# Patient Record
Sex: FEMALE | Race: BLACK OR AFRICAN AMERICAN
[De-identification: names, ages, dates, MRNs, and addresses within clinical notes are randomized per-mention and may not be internally consistent; named-entity substitution may affect disease eponyms.]

---

## 2017-04-11 ENCOUNTER — HOSPITAL ENCOUNTER (EMERGENCY)
Dept: HOSPITAL 62 - ER | Age: 27
LOS: 1 days | Discharge: HOME | End: 2017-04-12
Payer: MEDICAID

## 2017-04-11 DIAGNOSIS — Z88.0: ICD-10-CM

## 2017-04-11 DIAGNOSIS — Z87.19: ICD-10-CM

## 2017-04-11 DIAGNOSIS — M79.1: ICD-10-CM

## 2017-04-11 DIAGNOSIS — R00.0: ICD-10-CM

## 2017-04-11 DIAGNOSIS — J02.0: Primary | ICD-10-CM

## 2017-04-11 DIAGNOSIS — R11.2: ICD-10-CM

## 2017-04-11 PROCEDURE — S0119 ONDANSETRON 4 MG: HCPCS

## 2017-04-11 PROCEDURE — 99283 EMERGENCY DEPT VISIT LOW MDM: CPT

## 2017-04-11 PROCEDURE — 87880 STREP A ASSAY W/OPTIC: CPT

## 2017-04-11 PROCEDURE — 96360 HYDRATION IV INFUSION INIT: CPT

## 2017-04-12 VITALS — SYSTOLIC BLOOD PRESSURE: 119 MMHG | DIASTOLIC BLOOD PRESSURE: 73 MMHG

## 2017-04-12 NOTE — ER DOCUMENT REPORT
ED Flu Like





- General


Chief Complaint: Flu Symptoms


Stated Complaint: FLU LIKE SYMPTOMS


Time seen by provider: 03:23


Mode of Arrival: Ambulatory


Information source: Patient


TRAVEL OUTSIDE OF THE [place] IN LAST 30 DAYS: No





- HPI


Patient complains to provider of: fever, sore throat, vomiting


Onset: Yesterday


Timing/Duration: Persistent


Quality of pain: Achy


Severity: Mild


Pain Level: 2


Associated symptoms: Body/muscle aches, Fever, Nausea, Vomiting, Sore throat


Similar symptoms previously: No


Recently seen / treated by doctor: No


Notes: 


Patient is a 26-year-old female who presents to the emergency room complaining 

of sore throat, subjective fever, vomiting that started yesterday, she denies 

any sick contacts, no recent traveling, denies chest pain, no abdominal pain no 

diarrhea, patient did not take any medication at home prior to coming to the 

emergency room





- Related Data


Allergies/Adverse Reactions: 


 





Penicillins Allergy (Verified 04/24/16 08:33)


 











Past Medical History





- General


Information source: Patient





- Social History


Smoking Status: Never Smoker


Chew tobacco use (# tins/day): No


Frequency of alcohol use: Rare


Drug Abuse: None


Family History: CVA, Other - anemia


Patient has suicidal ideation: No


Patient has homicidal ideation: No


Pulmonary Medical History: 


   Denies: Hx Asthma


Neurological Medical History: Reports: Hx Migraine


Renal/ Medical History: Denies: Hx Peritoneal Dialysis


GI Medical History: Denies: Hx Gastroesophageal Reflux Disease


Past Surgical History: Reports: Hx Breast Surgery - reduction 2006





- Immunizations


Hx Diphtheria, Pertussis, Tetanus Vaccination: Yes





Review of Systems





- Review of Systems


Constitutional: See HPI


EENT: See HPI


Cardiovascular: No symptoms reported


Respiratory: No symptoms reported


Gastrointestinal: See HPI


Genitourinary: No symptoms reported


Female Genitourinary: No symptoms reported


Musculoskeletal: No symptoms reported


Skin: No symptoms reported


Hematologic/Lymphatic: No symptoms reported


Neurological/Psychological: No symptoms reported


-: Yes All other systems reviewed and negative





Physical Exam





- Vital signs


Vitals: 


 











Temp Pulse Resp BP Pulse Ox


 


 99.8 F   133 H  22 H  113/65   97 


 


 04/11/17 22:35  04/11/17 22:35  04/11/17 22:35  04/11/17 22:35  04/11/17 22:35











Interpretation: Tachycardic





- General


General appearance: Alert


In distress: None





- HEENT


Head: Normocephalic, Atraumatic


Eyes: Normal


Conjunctiva: Normal


Extraocular movements intact: Yes


Eyelashes: Normal


Pupils: PERRL


Mouth/Lips: Normal


Mucous membranes: Normal


Pharynx: Erythema, Exudate, Tonsillar hypertrophy


Neck: Normal





- Respiratory


Respiratory status: No respiratory distress


Chest status: Nontender


Breath sounds: Normal


Chest palpation: Normal





- Cardiovascular


Rhythm: Regular, Tachycardia


Heart sounds: Normal auscultation


Murmur: No





- Abdominal


Inspection: Normal, Obese


Distension: No distension


Bowel sounds: Normal


Tenderness: Nontender


Organomegaly: No organomegaly





- Back


Back: Normal, Nontender





- Extremities


General upper extremity: Normal inspection, Nontender, Normal color, Normal ROM

, Normal temperature


General lower extremity: Normal inspection, Nontender, Normal color, Normal ROM

, Normal temperature, Normal weight bearing.  No: Adelaida's sign





- Neurological


Neuro grossly intact: Yes


Cognition: Normal


Orientation: AAOx4


[place] Coma Scale Eye Opening: Spontaneous


[place] Coma Scale Verbal: Oriented


[place] Coma Scale Motor: Obeys Commands


[place] Coma Scale Total: 15


Speech: Normal


Motor strength normal: LUE, RUE, LLE, RLE


Sensory: Normal





- Psychological


Associated symptoms: Normal affect, Normal mood





- Skin


Skin Temperature: Warm


Skin Moisture: Dry


Skin Color: Normal





Course





- Re-evaluation


Re-evalutation: 


04/12/17 04:14


Patient resting comfortably reports feeling much better, physical exam findings 

are consistent with strep pharyngitis, rapid strep test confirms this, she will 

be started on antibiotics for this, advised to follow-up with her primary care 

provider or return if symptoms worsen, patient acknowledges understanding and 

agreement with this











- Vital Signs


Vital signs: 


 











Temp Pulse Resp BP Pulse Ox


 


 99.0 F   118 H  16   119/73   97 


 


 04/12/17 03:16  04/12/17 03:16  04/12/17 03:16  04/12/17 04:01  04/12/17 04:01














Discharge





- Discharge


Clinical Impression: 


 Strep pharyngitis





Nausea and vomiting


Qualifiers:


 Vomiting type: unspecified Vomiting Intractability: non-intractable Qualified 

Code(s): R11.2 - Nausea with vomiting, unspecified





Condition: Stable


Disposition: HOME, SELF-CARE


Instructions:  Antinausea Medication ([facility]), Intravenous (IV) Fluids ([facility]), 

Strep Throat ([facility])


Additional Instructions: 


Drink plenty of fluids.  Tylenol or Motrin as needed for fever.  Follow-up with 

your primary care provider in one to 2 days.  Return to the emergency room 

immediately if symptoms worsen or any additional concerns.


Prescriptions: 


Clindamycin HCl 300 mg PO RTTIDP PRN #30 capsule


 PRN Reason: 


Forms:  Return to Work

## 2017-09-08 ENCOUNTER — HOSPITAL ENCOUNTER (EMERGENCY)
Dept: HOSPITAL 62 - ER | Age: 27
Discharge: HOME | End: 2017-09-08
Payer: COMMERCIAL

## 2017-09-08 VITALS — SYSTOLIC BLOOD PRESSURE: 119 MMHG | DIASTOLIC BLOOD PRESSURE: 74 MMHG

## 2017-09-08 DIAGNOSIS — I10: ICD-10-CM

## 2017-09-08 DIAGNOSIS — B35.4: Primary | ICD-10-CM

## 2017-09-08 DIAGNOSIS — Z88.0: ICD-10-CM

## 2017-09-08 PROCEDURE — 99283 EMERGENCY DEPT VISIT LOW MDM: CPT

## 2017-09-08 NOTE — ER DOCUMENT REPORT
ED Skin Rash/Insect Bite/Abscs





- General


Chief Complaint: Skin Problem


Stated Complaint: RASH,SWOLLEN NECK


Time Seen by Provider: 09/08/17 18:31


Mode of Arrival: Ambulatory


Information source: Patient


TRAVEL OUTSIDE OF THE [place] IN LAST 30 DAYS: No





- HPI


Patient complains to provider of: Other - dark brown irritations to right side 

of face for 2 weeks


Onset: Other - 2 weeks


Onset/Duration: Gradual


Quality of pain: Burning


Severity: Mild


Pain Level: 1


Skin Character: Other - dark brown patches to right  lower face


Quality of rash: Itchy


Identify cause: No


Exacerbated by: Denies


Relieved by: Denies


Similar symptoms previously: No


Recently seen / treated by doctor: No





- Related Data


Allergies/Adverse Reactions: 


 





Penicillins Allergy (Verified 09/08/17 17:38)


 











Past Medical History





- General


Information source: Patient





- Social History


Smoking Status: Never Smoker


Cigarette use (# per day): No


Chew tobacco use (# tins/day): No


Smoking Education Provided: No


Frequency of alcohol use: Rare


Drug Abuse: None


Occupation: exchange


Lives with: Family


Family History: CVA, Other - anemia





- Past Medical History


Cardiac Medical History: Reports: Hx Hypertension


Pulmonary Medical History: Reports: None


EENT Medical History: Reports: None


Neurological Medical History: Reports: Hx Migraine


Endocrine Medical History: Reports: None


Renal/ Medical History: Reports: None


Malignancy Medical History: Reports: None


GI Medical History: Reports: None


Musculoskeltal Medical History: Reports None


Skin Medical History: Reports None


Psychiatric Medical History: Reports: None


Traumatic Medical History: Reports: None


Infectious Medical History: Reports: None


Past Surgical History: Reports: Hx Breast Surgery - reduction 2006





- Immunizations


Hx Diphtheria, Pertussis, Tetanus Vaccination: Yes





Review of Systems





- Review of Systems


Constitutional: No symptoms reported


EENT: Other - fungal dark spots  to face started 3 weeks ago, tender post 

aricula node


Cardiovascular: No symptoms reported


Respiratory: No symptoms reported


Gastrointestinal: No symptoms reported


Genitourinary: No symptoms reported


Female Genitourinary: No symptoms reported


Musculoskeletal: No symptoms reported


Skin: No symptoms reported


Hematologic/Lymphatic: No symptoms reported


Neurological/Psychological: No symptoms reported


-: Yes All other systems reviewed and negative





Physical Exam





- Vital signs


Vitals: 





 











Temp Pulse Resp BP Pulse Ox


 


 98.8 F   106 H  18   119/74   96 


 


 09/08/17 17:39  09/08/17 17:39  09/08/17 17:39  09/08/17 17:39  09/08/17 17:39











Interpretation: Normal





- General


General appearance: Appears well, Alert





- HEENT


Head: Normocephalic, Atraumatic


Eyes: Normal


Pupils: PERRL





- Respiratory


Respiratory status: No respiratory distress


Chest status: Nontender


Breath sounds: Normal


Chest palpation: Normal





- Cardiovascular


Rhythm: Regular


Heart sounds: Normal auscultation


Murmur: No





- Abdominal


Inspection: Normal


Distension: No distension


Bowel sounds: Normal


Tenderness: Nontender


Organomegaly: No organomegaly





- Back


Back: Normal, Nontender





- Extremities


General upper extremity: Normal inspection, Nontender, Normal color, Normal ROM

, Normal temperature


General lower extremity: Normal inspection, Nontender, Normal color, Normal ROM

, Normal temperature, Normal weight bearing.  No: Adelaida's sign





- Neurological


Neuro grossly intact: Yes


Cognition: Normal


Orientation: AAOx4


[place] Coma Scale Eye Opening: Spontaneous


[place] Coma Scale Verbal: Oriented


[place] Coma Scale Motor: Obeys Commands


[place] Coma Scale Total: 15


Speech: Normal


Motor strength normal: LUE, RUE, LLE, RLE


Sensory: Normal





- Psychological


Associated symptoms: Normal affect, Normal mood





- Skin


Skin Temperature: Warm


Skin Moisture: Dry


Skin Color: Normal


Location of irregularity: Face


Character of irregularity: Other - dark brown fungal infection to right lower 

are of face





Course





- Re-evaluation


Re-evalutation: 





09/08/17 18:58


Discussed treatment with patient after she was assessed by Dr. Daniel.  Patient 

discharged home with prescription for Lotrisone





- Vital Signs


Vital signs: 





 











Temp Pulse Resp BP Pulse Ox


 


 98.8 F   106 H  18   119/74   96 


 


 09/08/17 17:39  09/08/17 17:39  09/08/17 17:39  09/08/17 17:39  09/08/17 17:39














Discharge





- Discharge


Clinical Impression: 


 Tinea corporis





Condition: Stable


Disposition: HOME, SELF-CARE


Additional Instructions: 


You have a fungal infection to your face called tinea will need to be treated 

with antifungal cream..





Topical Antifungal





     You have been given a prescription for an antifungal skin cream. Examples 

include Lamisil (terbinafine), Lotrimin (clotrimazole), Monistat-Derm (

miconazole), Nizoral (ketoconazole), and Tinactin (tolnaftate).


     It may be several days before improvement occurs.  You should use the 

medicine for about two weeks to eliminate the fungus, unless the doctor tells 

you otherwise.


     It is unusual to be allergic to these medicines.  Call the doctor if you 

develop blisters, severe itching, worsening redness, swelling, or pain, or if 

you don't see any improvement within one week.





FOLLOW-UP CARE:


If you have been referred to a physician for follow-up care, call the physician

s office for an appointment as you were instructed or within the next two days.

  If you experience worsening or a significant change in your symptoms, notify 

the physician immediately or return to the Emergency Department at any time for 

re-evaluation.





Prescriptions: 


Clotrimazole/Betamethasone Dip [Lotrisone Cream 15 gm] 1 applic TP BID #1 tube


Forms:  Return to Work


Referrals: 


YOSELIN LEWIS,  [ACTIVE STAFF] - Follow up as needed

## 2018-02-02 ENCOUNTER — HOSPITAL ENCOUNTER (OUTPATIENT)
Dept: HOSPITAL 62 - OD | Age: 28
End: 2018-02-02
Attending: FAMILY MEDICINE
Payer: COMMERCIAL

## 2018-02-02 DIAGNOSIS — M79.675: Primary | ICD-10-CM

## 2018-02-02 NOTE — RADIOLOGY REPORT (SQ)
EXAM DESCRIPTION:  FOOT LEFT COMPLETE



COMPLETED DATE/TIME:  2/2/2018 10:47 am



REASON FOR STUDY:  PAIN IN LEFT TOE(S) M79.675  PAIN IN LEFT TOE(S)



COMPARISON:  None.



NUMBER OF VIEWS:  Three views.



TECHNIQUE:  AP, lateral and oblique without weight bearing radiographic images acquired of the left f
oot.



LIMITATIONS:  None.



FINDINGS:  MINERALIZATION: Normal.

BONES: No acute fracture or dislocation. No worrisome bone lesions. No significant osteophytes.

JOINTS: No erosions.  No wayne-articular osteopenia.  No chondrocalcinosis.

SOFT TISSUES: No swelling.  No calcifications.

OTHER: No other significant finding.



IMPRESSION:  NEGATIVE STUDY OF THE LEFT FOOT. NO EXPLANATION FOR PAIN.



TECHNICAL DOCUMENTATION:  JOB ID:  [id number]

 2011 [employer]- All Rights Reserved
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
## 2018-06-10 NOTE — ER DOCUMENT REPORT
ED General





- General


Chief Complaint: Abscess


Stated Complaint: SKIN ISSUE


Time Seen by Provider: 06/10/18 08:21


Mode of Arrival: Ambulatory


Information source: Patient


Notes: 





28-year-old female presents with a left buttocks abscess 5 day duration.  

Patient denies any previous similar episodes notes initially it felt like an 

ingrown hair she tried to squeeze and since then the symptoms worsen, she has 

tried to do hot baths with no improvement.  She denies a history of any 

previous abscesses, denies any fevers or chills denies any nausea vomiting or 

diarrhea notes pain


TRAVEL OUTSIDE OF THE [place] IN LAST 30 DAYS: No





- HPI


Onset: Other - 3 5 days


Onset/Duration: Persistent, Worse


Quality of pain: Sharp


Severity: Moderate


Pain Level: 2


Associated symptoms: Other


Exacerbated by: Other


Relieved by: Denies


Similar symptoms previously: No


Recently seen / treated by doctor: No





- Related Data


Allergies/Adverse Reactions: 


 





Penicillins Allergy (Verified 06/10/18 07:54)


 











Past Medical History





- Social History


Smoking Status: Never Smoker


Cigarette use (# per day): No


Chew tobacco use (# tins/day): No


Smoking Education Provided: No


Frequency of alcohol use: None


Drug Abuse: None


Family History: CVA, Other - anemia


Patient has suicidal ideation: No


Patient has homicidal ideation: No





- Past Medical History


Cardiac Medical History: Reports: Hx Hypertension


Pulmonary Medical History: 


   Denies: Hx Asthma


Neurological Medical History: Reports: Hx Migraine


Renal/ Medical History: Denies: Hx Peritoneal Dialysis


GI Medical History: Denies: Hx Gastroesophageal Reflux Disease


Past Surgical History: Reports: Hx Breast Surgery - reduction 2006





- Immunizations


Hx Diphtheria, Pertussis, Tetanus Vaccination: Yes





Review of Systems





- Review of Systems


Notes: 





REVIEW OF SYSTEMS:


CONSTITUTIONAL :  Denies fever,  chills, or sweats.  Denies recent illness.


EENT:   Denies eye, ear, throat, or mouth pain or symptoms.  Denies nasal or 

sinus congestion or discharge.  Denies throat, tongue, or mouth swelling or 

difficulty swallowing.


CARDIOVASCULAR:  Denies chest pain.  Denies palpitations or racing or irregular 

heart beat.  Denies ankle edema.


RESPIRATORY:  Denies cough, cold, or chest congestion.  Denies shortness of 

breath, difficulty breathing, or wheezing.


GASTROINTESTINAL:  Denies abdominal pain or distention.  Denies nausea, vomiting

, or diarrhea.  Denies blood in vomitus, stools, or per rectum.  Denies black, 

tarry stools.  Denies constipation. 


GENITOURINARY:  Denies difficulty urinating, painful urination, burning, 

frequency, blood in urine, or discharge.


FEMALE  GENITOURINARY:  Denies vaginal bleeding, heavy or abnormal periods, 

irregular periods.  Denies vaginal discharge or odor. 


MUSCULOSKELETAL:  Denies back or neck pain or stiffness.  Denies joint pain or 

swelling.


SKIN:   Admits to abscess


HEMATOLOGIC :   Denies easy bruising or bleeding.


LYMPHATIC:  Denies swollen, enlarged glands.


NEUROLOGICAL:  Denies confusion or altered mental status.  Denies passing out 

or loss of consciousness.  Denies dizziness or lightheadedness.  Denies 

headache.  Denies weakness or paralysis or loss of use of either side.  Denies 

problems with gait or speech.  Denies sensory loss, numbness, or tingling.  

Denies seizures.


PSYCHIATRIC:  Denies anxiety or stress.  Denies depression, suicidal ideation, 

or homicidal ideation.





ALL OTHER SYSTEMS REVIEWED AND NEGATIVE.











PHYSICAL EXAMINATION:





GENERAL: Well-appearing, well-nourished and in no acute distress.





HEAD: Atraumatic, normocephalic.





EYES: Pupils equal round extraocular movements intact,  conjunctiva are normal.





ENT: Nares patent





NECK: Normal range of motion





LUNGS: No respiratory distress





Musculoskeletal: Normal range of motion





NEUROLOGICAL:  Normal speech, normal gait. 





PSYCH: Normal mood, normal affect.





SKIN: left buttocks abscess measuring 4x3 cm near the labia majora but without 

involvment of the pubic region, it is pustular and tender

















Dictation was performed using Dragon voice recognition software





Physical Exam





- Vital signs


Vitals: 


 











Temp Pulse Resp BP Pulse Ox


 


 98.8 F   103 H  16   120/62   99 


 


 06/10/18 07:58  06/10/18 07:58  06/10/18 07:58  06/10/18 07:58  06/10/18 07:58














Course





- Re-evaluation


Re-evalutation: 





06/10/18 08:36


Patient's presentation most consistent with an abscess


06/10/18 08:52


Area was incised and drained large amount of pus was noted,, culture obtained I 

explored the wound thoroughly and will place her on antibiotic





After performing a Medical Screening Examination, I estimate there is LOW risk 

for RETAINED FOREIGN BODY, thus I consider the discharge disposition 

reasonable. Also, there is no evidence or peritonitis, sepsis, or toxicity.  I 

have reevaluated this patient multiple times and no significant life 

threatening changes are noted. The patient and I have discussed the diagnosis 

and risks, and we agree with discharging home with close follow-up with the 

understanding that symptoms and presentations can change. We also discussed 

returning to the Emergency Department immediately if new or worsening symptoms 

occur. We have discussed the symptoms which are most concerning (e.g., changing 

or worsening pain, fever, numbness, weakness, cool or painful digits) that 

necessitate immediate return.


06/10/18 08:58


We discussed patient's allergy to penicillin which is only hives, cross 

reactivity with cephalosporins unlikely





- Vital Signs


Vital signs: 


 











Temp Pulse Resp BP Pulse Ox


 


 98.8 F   103 H  16   120/62   99 


 


 06/10/18 07:58  06/10/18 07:58  06/10/18 07:58  06/10/18 07:58  06/10/18 07:58














Procedures





- Incision and Drainage


  ** Left Buttock


Time completed: 08:57


Type: Multiple


Anesthetic type: 1% Lidocaine


mL's of anesthetic: 5


Blade size: 11


I&D procedure: Shurclens applied, Sterile dressing applied


Incision Method: Incision made by scalpel


Amount/type of drainage: Large amount of pus





Discharge





- Discharge


Clinical Impression: 


 Left buttock abscess





Condition: Stable


Disposition: HOME, SELF-CARE


Instructions:  Post Incision and Drainage


Additional Instructions: 


Follow up with your physician tomorrow for further care or return to the ED 

IMMEDIATELY if symptoms worsen or new concerns occur. If you cannot afford to 

follow up with your primary care physician a list of low cost clinics have been 

provided at the end of your discharge papers as well.


Prescriptions: 


Cephalexin Monohydrate [Keflex 500 mg Capsule] 500 mg PO QID #40 capsule


Hydrocodone/Acetaminophen [Norco 5-325 mg Tablet] 1 tab PO Q6 #10 tablet


Sulfamethoxazole/Trimethoprim [Bactrim Ds Tablet] 2 each PO BID #40 tablet

## 2019-04-25 NOTE — RADIOLOGY REPORT (SQ)
EXAM DESCRIPTION:  U/S NON OB PEL TV W/DOPPLER



COMPLETED DATE/TIME:  4/25/2019 11:52 am



REASON FOR STUDY:  abd pain



COMPARISON:  CT abdomen pelvis 2/9/2013

Pelvic ultrasound 5/26/2012



TECHNIQUE:  Dynamic and static grayscale images acquired of the pelvis via transvaginal approach and 
recorded on PACS. Additional selected color Doppler and spectral images recorded.



LIMITATIONS:  None.



FINDINGS:  UTERUS: Contour normal.  No mass.  Uterus is 9 x 4 x 4 cm in size

ENDOMETRIAL STRIPE: No focal or generalized thickening. No masses.  Endometrium 3 mm in thickness, an
 IUD is present in good positioning.

CERVIX: No nabothian cysts.  Closed, 2.3 cm in length.

RIGHT OVARY AND DOPPLER: Normal size, 2.5 x 1.6 x 0.9 cm in size. No worrisome masses. Normal arteria
l vascular flow without evidence for torsion.

LEFT OVARY AND DOPPLER: Normal size, 3.9 x 2.8 x 2.1 cm in size.  2.8 x 2.5 cm cyst is present with s
julio septation and low level internal echoes likely a hemorrhagic cyst.  No worrisome masses. Normal
 arterial vascular flow without evidence for torsion.

FREE FLUID: None noted.

OTHER: No other significant finding.



IMPRESSION:  2.8 CM LEFT OVARIAN CYST.  NO ULTRASOUND EVIDENCE OF LEFT OVARIAN TORSION.  NO LEFT ADNE
XAL OR CUL-DE-SAC FREE FLUID

IUD IN THE UTERUS.

OTHERWISE, UNREMARKABLE TRANSVAGINAL PELVIC ULTRASOUND.



TECHNICAL DOCUMENTATION:  JOB ID:  [id number]

 2011 [employer]- All Rights Reserved                          Rev-[id number]



Reading location - IP/workstation name: [email or web address]

## 2019-04-25 NOTE — ER DOCUMENT REPORT
ED General





- General


Chief Complaint: Abdominal Pain


Stated Complaint: ABDOMINAL PAIN


Time Seen by Provider: 04/25/19 08:27


Primary Care Provider: 


KENNETH LIU MD [Primary Care Provider] - Follow up as needed


TRAVEL OUTSIDE OF THE [place] IN LAST 30 DAYS: No





- HPI


Patient complains to provider of: Left lower abdominal pain


Notes: 





Patient coming for left lower abdominal pain.  Patient states left abdominal 

pain ongoing for greater than the last 5 days.  Patient denies any vaginal 

discharge states she is having some vaginal bleeding.  Patient does have an IUD 

present.  Patient states multiple sexual partners.  Patient denies any fever 

chills nausea vomiting or diarrhea.  Patient otherwise resting company upon my 

evaluation states pain is worse with movement





- Related Data


Allergies/Adverse Reactions: 


                                        





Penicillins Allergy (Verified 04/25/19 08:02)


   











Past Medical History





- Social History


Smoking Status: Never Smoker


Frequency of alcohol use: None


Drug Abuse: None


Family History: CVA, Other - anemia


Patient has suicidal ideation: No


Patient has homicidal ideation: No





- Past Medical History


Cardiac Medical History: Reports: Hx Hypertension


Pulmonary Medical History: 


   Denies: Hx Asthma


Neurological Medical History: Reports: Hx Migraine


Renal/ Medical History: Denies: Hx Peritoneal Dialysis


GI Medical History: Denies: Hx Gastroesophageal Reflux Disease


Past Surgical History: Reports: Hx Breast Surgery - reduction 2006





- Immunizations


Hx Diphtheria, Pertussis, Tetanus Vaccination: Yes





Review of Systems





- Review of Systems


Constitutional: No symptoms reported


EENT: No symptoms reported


Cardiovascular: No symptoms reported


Respiratory: No symptoms reported


Gastrointestinal: Abdominal pain


Genitourinary: No symptoms reported


Female Genitourinary: No symptoms reported


Musculoskeletal: No symptoms reported


Skin: No symptoms reported


Hematologic/Lymphatic: No symptoms reported


Neurological/Psychological: No symptoms reported


-: Yes All other systems reviewed and negative





Physical Exam





- Vital signs


Vitals: 


                                        











Temp Pulse Resp BP Pulse Ox


 


 98.4 F   82   16   123/72   97 


 


 04/25/19 08:05  04/25/19 08:05  04/25/19 08:05  04/25/19 08:05  04/25/19 08:05











Interpretation: Normal





- General


General appearance: Appears well, Alert





- HEENT


Head: Normocephalic, Atraumatic


Eyes: Normal


Pupils: PERRL





- Respiratory


Respiratory status: No respiratory distress


Chest status: Nontender


Breath sounds: Normal


Chest palpation: Normal





- Cardiovascular


Rhythm: Regular


Heart sounds: Normal auscultation


Murmur: No





- Abdominal


Inspection: Normal


Distension: No distension


Bowel sounds: Normal


Tenderness: Nontender


Organomegaly: No organomegaly





- Genitourinary


External exam: Normal


Speculum exam: Cervix closed


Vaginal bleeding: Moderate


Bimanuel exam: Normal





- Back


Back: Normal, Nontender





- Extremities


General upper extremity: Normal inspection, Nontender, Normal color, Normal ROM,

Normal temperature


General lower extremity: Normal inspection, Nontender, Normal color, Normal ROM,

Normal temperature, Normal weight bearing.  No: Adelaida's sign





- Neurological


Neuro grossly intact: Yes


Cognition: Normal


Orientation: AAOx4


David Coma Scale Eye Opening: Spontaneous


David Coma Scale Verbal: Oriented


David Coma Scale Motor: Obeys Commands


Posey Coma Scale Total: 15


Speech: Normal


Motor strength normal: LUE, RUE, LLE, RLE


Sensory: Normal





- Psychological


Associated symptoms: Normal affect, Normal mood





- Skin


Skin Temperature: Warm


Skin Moisture: Dry


Skin Color: Normal





Course





- Re-evaluation


Re-evalutation: 





04/25/19 13:37


Patient with a chronic anemia.  Patient did return positive for trichomonas we 

will go ahead and treat the patient for gonorrhea and chlamydia.  Discussed IUD 

placement with the OB/GYN recommends leaving the IUD in place.  Recommends 

treatment with 10-day course of Flagyl for trichomonas.  Patient has a left 

ovarian cyst possible etiology of pain no signs of torsion.  Patient will be 

discharged home follow-up primary care physician.





- Vital Signs


Vital signs: 


                                        











Temp Pulse Resp BP Pulse Ox


 


 98.4 F   82   16   123/72   97 


 


 04/25/19 08:05  04/25/19 08:05  04/25/19 08:05  04/25/19 08:05  04/25/19 08:05














- Laboratory


Result Diagrams: 


                                 04/25/19 08:32





                                 04/25/19 08:32


Laboratory results interpreted by me: 


                                        











  04/25/19 04/25/19





  08:32 09:05


 


RBC  6.34 H 


 


Hgb  8.2 L 


 


Hct  28.7 L 


 


MCV  < 50 L* 


 


MCH  12.9 L 


 


MCHC  28.5 L 


 


RDW  20.7 H 


 


Seg Neuts % (Manual)  36 L 


 


Band Neutrophils %  1 L 


 


Lymphocytes % (Manual)  53 H 


 


Urine Blood   MODERATE H


 


Urine Urobilinogen   2.0 H














Discharge





- Discharge


Clinical Impression: 


 Trichomonal infection, Left ovarian cyst





Condition: Good


Disposition: HOME, SELF-CARE


Instructions:  Chlamydia ([facility]), Gonorrhea ([facility]), Ovarian Cyst ([facility]), Trichomonas

Infection ([facility])


Additional Instructions: 


Your pelvic examination today does show signs of a sexual transmitted disease 

called trichomonas.  We will go ahead and treat you also for gonorrhea and 

chlamydia.  Please take all of the antibiotics as prescribed.  Return to the ER 

symptoms worsen follow-up with your primary care physician.  Please avoid any 

sexual contact for the next 2 weeks.  Please let your sexual partners be aware 

as that they will need to be treated as well.





You can call the ER tomorrow for your gonorrhea and Chlamydia results.


Prescriptions: 


Metronidazole [Flagyl 500 mg Tablet] 500 mg PO TID #30 tablet


Referrals: 


KENNETH LIU MD [Primary Care Provider] - Follow up as needed